# Patient Record
Sex: MALE | Race: WHITE | NOT HISPANIC OR LATINO | Employment: FULL TIME | URBAN - METROPOLITAN AREA
[De-identification: names, ages, dates, MRNs, and addresses within clinical notes are randomized per-mention and may not be internally consistent; named-entity substitution may affect disease eponyms.]

---

## 2023-08-09 ENCOUNTER — HOSPITAL ENCOUNTER (EMERGENCY)
Facility: HOSPITAL | Age: 28
Discharge: HOME | End: 2023-08-09
Attending: EMERGENCY MEDICINE
Payer: COMMERCIAL

## 2023-08-09 VITALS
RESPIRATION RATE: 18 BRPM | HEIGHT: 71 IN | SYSTOLIC BLOOD PRESSURE: 137 MMHG | WEIGHT: 170 LBS | TEMPERATURE: 98 F | OXYGEN SATURATION: 97 % | HEART RATE: 70 BPM | DIASTOLIC BLOOD PRESSURE: 81 MMHG | BODY MASS INDEX: 23.8 KG/M2

## 2023-08-09 DIAGNOSIS — K64.0 GRADE I HEMORRHOIDS: ICD-10-CM

## 2023-08-09 DIAGNOSIS — K64.4 EXTERNAL HEMORRHOIDS: ICD-10-CM

## 2023-08-09 DIAGNOSIS — K64.5 THROMBOSED EXTERNAL HEMORRHOID: Primary | ICD-10-CM

## 2023-08-09 PROCEDURE — 46083 INC THROMBOSED HROID XTRNL: CPT | Performed by: SURGERY

## 2023-08-09 PROCEDURE — 99284 EMERGENCY DEPT VISIT MOD MDM: CPT

## 2023-08-09 RX ORDER — LIDOCAINE HYDROCHLORIDE AND EPINEPHRINE 10; 10 MG/ML; UG/ML
40 INJECTION, SOLUTION INFILTRATION; PERINEURAL ONCE
Status: COMPLETED | OUTPATIENT
Start: 2023-08-09 | End: 2023-08-09

## 2023-08-09 RX ORDER — QUETIAPINE FUMARATE 200 MG/1
200 TABLET, FILM COATED ORAL
COMMUNITY

## 2023-08-09 RX ORDER — BUPROPION HYDROCHLORIDE 450 MG/1
450 TABLET, FILM COATED, EXTENDED RELEASE ORAL DAILY
COMMUNITY

## 2023-08-09 RX ADMIN — LIDOCAINE HYDROCHLORIDE,EPINEPHRINE BITARTRATE 40 ML: 10; .01 INJECTION, SOLUTION INFILTRATION; PERINEURAL at 12:38

## 2023-08-09 NOTE — DISCHARGE INSTRUCTIONS
Instructions per surgery. Return to the ER for any new, concerning, or worsening issues. Please follow-up with the surgery department in 7 days for repeat evaluation. Please call the number attached to your instructions.
Yes

## 2023-08-09 NOTE — PROCEDURES
Incision and drain    Date/Time: 8/9/2023 1:03 PM    Performed by: Anny Hahn MD  Authorized by: Anny Hahn MD  Universal Protocol:  Procedure performed by: Orly Diaz [facility])  Consent: Verbal consent obtained. Risks and benefits: risks, benefits and alternatives were discussed  Time out: Immediately prior to procedure a "time out" was called to verify the correct patient, procedure, equipment, support staff and site/side marked as required. Timeout called at: 8/9/2023 1:03 PM.  Patient understanding: patient states understanding of the procedure being performed  Patient consent: the patient's understanding of the procedure matches consent given  Site marked: the operative site was marked  Patient identity confirmed: verbally with patient      Patient location:  ED  Location:     Indications for incision and drainage: Thrombosed external hemorrhoid. Location:  Anogenital    Anogenital location:  Perianal  Pre-procedure details:     Skin preparation:  Betadine  Anesthesia (see MAR for exact dosages): Anesthesia method:  Local infiltration    Local anesthetic:  Lidocaine 1% WITH epi  Procedure details:     Complexity:  Simple    Incision types:  Single straight    Scalpel blade:  15    Approach:  Open    Incision depth:  Subcutaneous    Wound management:  Probed and deloculated    Drainage:  Bloody    Drainage amount:  Scant    Wound treatment:  Wound left open    Packing materials:  None  Post-procedure details:     Patient tolerance of procedure:   Tolerated well, no immediate complications

## 2023-08-09 NOTE — CONSULTS
Consultation - General Surgery   Yessica Castro 33 y.o. male MRN: [id number]  Unit/Bed#: ED 19 Encounter: [id number]    Assessment/Plan     Assessment:  28yo male with no significant PMH presents with external hemorrhoid   -one external hemorrhoid noted at 3 o'clock, soft, extremely tender     Plan:  -external hemorrhoid I&D at bedside by Dr Lory Onofre  -patient okay to be discharged post procedure and follow up in our office next week  -continue sitz baths and analgesia as needed  -examined at bedside with Dr Lory Onofre    History of Present Illness     HPI:  Yessica Castro is a 33 y.o. male who presents with an external hemorrhoid. This has been extremely painful for him the last few days and he would like us to open the area. He has two external hemorrhoids in the last two years that were treated with I&D. He has tried sitz baths and external creams without relief. Denies fever, chills, abdominal pain. Consults    Review of Systems   Constitutional: Negative. HENT: Negative. Respiratory: Negative. Cardiovascular: Negative. Gastrointestinal: Positive for rectal pain. Genitourinary: Negative. Musculoskeletal: Negative. Skin: Negative. Neurological: Negative. Psychiatric/Behavioral: Negative. Historical Information   History reviewed. No pertinent past medical history.   Past Surgical History:   Procedure Laterality Date   • COLONOSCOPY     • WISDOM TOOTH EXTRACTION       Social History   Social History     Substance and Sexual Activity   Alcohol Use Not Currently     Social History     Substance and Sexual Activity   Drug Use Yes   • Types: Marijuana     E-Cigarette/Vaping   • E-Cigarette Use Never User      E-Cigarette/Vaping Substances     Social History     Tobacco Use   Smoking Status Never   Smokeless Tobacco Never     Family History: non-contributory    Meds/Allergies   all current active meds have been reviewed  No Known Allergies    Objective   First Vitals: Blood Pressure: 137/81 (08/09/23 1010)  Pulse: 70 (08/09/23 1010)  Temperature: 98 °F (36.7 °C) (08/09/23 1010)  Temp Source: Temporal (08/09/23 1010)  Respirations: 18 (08/09/23 1010)  Height: 5' 11" (180.3 cm) (08/09/23 1010)  Weight - Scale: 77.1 kg (170 lb) (08/09/23 1010)  SpO2: 97 % (08/09/23 1010)    Current Vitals:   Blood Pressure: 137/81 (08/09/23 1010)  Pulse: 70 (08/09/23 1010)  Temperature: 98 °F (36.7 °C) (08/09/23 1010)  Temp Source: Temporal (08/09/23 1010)  Respirations: 18 (08/09/23 1010)  Height: 5' 11" (180.3 cm) (08/09/23 1010)  Weight - Scale: 77.1 kg (170 lb) (08/09/23 1010)  SpO2: 97 % (08/09/23 1010)    No intake or output data in the 24 hours ending 08/09/23 1359    Invasive Devices     None                 Physical Exam  Constitutional:       Appearance: Normal appearance. HENT:      Head: Normocephalic and atraumatic. Nose: Nose normal.      Mouth/Throat:      Mouth: Mucous membranes are moist.      Pharynx: Oropharynx is clear. Eyes:      Conjunctiva/sclera: Conjunctivae normal.      Pupils: Pupils are equal, round, and reactive to light. Cardiovascular:      Rate and Rhythm: Normal rate and regular rhythm. Pulses: Normal pulses. Heart sounds: Normal heart sounds. Pulmonary:      Effort: Pulmonary effort is normal.      Breath sounds: Normal breath sounds. Abdominal:      General: Abdomen is flat. Bowel sounds are normal.      Palpations: Abdomen is soft. Genitourinary:     Comments: One external hemorrhoid noted at 3 o'clock, no drainage or evidence of infection. Musculoskeletal:         General: Normal range of motion. Skin:     General: Skin is warm and dry. Neurological:      General: No focal deficit present. Mental Status: He is alert. Psychiatric:         Mood and Affect: Mood normal.             Counseling / Coordination of Care  Total floor / unit time spent today 20 minutes.   Greater than 50% of total time was spent with the patient and / or family counseling and / or coordination of care.      Severiano Dutton, PA-C

## 2023-08-09 NOTE — Clinical Note
Ronnie Tian was seen and treated in our emergency department on 8/9/2023. Other - See Comments        Diagnosis:     Ksenia Simon  may return to work on return date. He may return on this date: 08/12/2023    Patient seen in the emergency department and treated by the surgical department. Patient will need to have 2 days off of work. Please excuse through the 11th. If you have any questions or concerns, please don't hesitate to call.       Hallie Staley., DO    ______________________________           _______________          _______________  Hospital Representative                              Date                                Time

## 2023-08-09 NOTE — ED PROVIDER NOTES
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 He states that when he was in the [place], he would go to the ER and they would cut them. He states that over-the-counter creams and suppositories do not work, and he just moved back to [place] and he has a small hemorrhoid at 3:00 on his anus. Patient notes he has some blood in his stool at times and difficulty with having bowel movements and notes that if it is not taking care of he will not be able to work. Patient denies other complaints at this time and is here for evaluation. Prior to Admission Medications   Prescriptions Last Dose Informant Patient Reported? Taking? QUEtiapine (SEROquel) 200 mg tablet 8/8/2023  Yes Yes   Sig: Take 200 mg by mouth daily at bedtime   buPROPion (FORFIVO XL) 450 MG 24 hr tablet 8/9/2023  Yes Yes   Sig: Take 450 mg by mouth daily      Facility-Administered Medications: None       History reviewed. No pertinent past medical history. Past Surgical History:   Procedure Laterality Date   • COLONOSCOPY     • WISDOM TOOTH EXTRACTION         History reviewed. No pertinent family history. I have reviewed and agree with the history as documented. E-Cigarette/Vaping   • E-Cigarette Use Never User      E-Cigarette/Vaping Substances     Social History     Tobacco Use   • Smoking status: Never   • Smokeless tobacco: Never   Vaping Use   • Vaping Use: Never used   Substance Use Topics   • Alcohol use: Not Currently   • Drug use: Yes     Types: Marijuana       Review of Systems   Constitutional: Positive for activity change. Negative for chills and fever. HENT: Negative for ear pain and sore throat. Eyes: Negative for pain and visual disturbance. Respiratory: Negative for cough and shortness of breath. Cardiovascular: Negative for chest pain and palpitations. Gastrointestinal: Positive for anal bleeding and blood in stool. Negative for abdominal pain and vomiting. Genitourinary: Negative for dysuria and hematuria. Musculoskeletal: Negative for arthralgias and back pain. Skin: Negative for color change and rash. Neurological: Negative for syncope. Psychiatric/Behavioral: The patient is nervous/anxious. All other systems reviewed and are negative. Physical Exam  Physical Exam  Vitals and nursing note reviewed. Constitutional:       General: He is not in acute distress. Appearance: He is well-developed. HENT:      Head: Normocephalic and atraumatic. Eyes:      Conjunctiva/sclera: Conjunctivae normal.   Cardiovascular:      Rate and Rhythm: Normal rate and regular rhythm. Heart sounds: No murmur heard. Pulmonary:      Effort: Pulmonary effort is normal. No respiratory distress. Breath sounds: Normal breath sounds. Abdominal:      Palpations: Abdomen is soft. Tenderness: There is no abdominal tenderness. Genitourinary:     Comments: At 3:00 on the anus that is about 0.6 cm in total diameter. It is not thrombosed. No evidence of cellulitis. Musculoskeletal:         General: No swelling. Cervical back: Neck supple. Skin:     General: Skin is warm and dry. Capillary Refill: Capillary refill takes less than 2 seconds. Neurological:      Mental Status: He is alert.    Psychiatric:         Mood and Affect: Mood normal.         Vital Signs  ED Triage Vitals [08/09/23 1010]   Temperature Pulse Respirations Blood Pressure SpO2   98 °F (36.7 °C) 70 18 137/81 97 %      Temp Source Heart Rate Source Patient Position - Orthostatic VS BP Location FiO2 (%)   Temporal Monitor -- -- --      Pain Score       9           Vitals:    08/09/23 1010   BP: 137/81   Pulse: 70         Visual Acuity      ED Medications  Medications   lidocaine-epinephrine (XYLOCAINE/EPINEPHRINE) 1 %-1:100,000 injection 40 mL (40 mL Infiltration Given 8/9/23 1238)       Diagnostic Studies  Results Reviewed     None                 No orders to display              Procedures  Procedures         ED Course                               SBIRT 22yo+    Flowsheet Row Most Recent Value   Initial Alcohol Screen: US AUDIT-C     1. How often do you have a drink containing alcohol? 0 Filed at: 08/09/2023 1013   2. How many drinks containing alcohol do you have on a typical day you are drinking? 0 Filed at: 08/09/2023 1013   3a. Male UNDER 65: How often do you have five or more drinks on one occasion? 0 Filed at: 08/09/2023 1013   3b. FEMALE Any Age, or MALE 65+: How often do you have 4 or more drinks on one occassion? 0 Filed at: 08/09/2023 1013   Audit-C Score 0 Filed at: 08/09/2023 1013   TALON: How many times in the past year have you. .. Used an illegal drug or used a prescription medication for non-medical reasons? Never Filed at: 08/09/2023 1013                    Medical Decision Making  Patient is a 60-year-old male who presents emergency room requesting a surgical procedure to remove or drain a hemorrhoid that he has had for the last few days. Patient notes that the pain is so severe that he is not able to work unless we are able to help him in the emergency department today. Patient appears very anxious upon my evaluation, and notes to be in mild to moderate distress. The patient notes that [place] procedural removal for drainage of a hemorrhoid in the emergency room. The patient is hoping to have the same done here today. Patient was told that my experience is to enucleate thrombosed hemorrhoids but not necessarily to drain them in the emergency department. Patient notes creams and other ointments do not work. For that reason, I contacted general surgery to have their input.    After I have consulted surgery, I received a [place] text from a GI fellow within the [facility]. 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 This had already occurred. General surgery evaluated the patient and performed the procedure at the bedside to the patient's satisfaction. Upon completion of the procedure I discussed the aftercare with surgery who noted that the patient merely needed to follow-up with the surgical PA in a week after the procedure. These were relayed on the patient's discharge instructions and the patient was discharged. External hemorrhoids: chronic illness or injury  Grade I hemorrhoids: acute illness or injury      Disposition  Final diagnoses:   External hemorrhoids   Grade I hemorrhoids     Time reflects when diagnosis was documented in both MDM as applicable and the Disposition within this note     Time User Action Codes Description Comment    8/9/2023 12:58 PM Juanita Macadamia Add [K64.4] External hemorrhoids     8/9/2023  1:00 PM Juanita Macadamia Add [K64.0] Grade I hemorrhoids     8/9/2023  1:05 PM Katie Janessa Modify [K64.4] External hemorrhoids     8/9/2023  1:05 PM Katie Janessa Add [K64.5] Thrombosed external hemorrhoid       ED Disposition     ED Disposition   Discharge    Condition   Stable    Date/Time   Wed Aug 9, 2023 12:58 PM    Comment   Waddell Goodpasture discharge to home/self care. Follow-up Information     Follow up With Specialties Details Why Contact Info    Vini Monahan PA-C Physician Assistant In 1 week  [address] [facility] [address]  [phone]            Discharge Medication List as of 8/9/2023  1:03 PM      CONTINUE these medications which have NOT CHANGED    Details   buPROPion (FORFIVO XL) 450 MG 24 hr tablet Take 450 mg by mouth daily, Historical Med      QUEtiapine (SEROquel) 200 mg tablet Take 200 mg by mouth daily at bedtime, Historical Med             No discharge procedures on file.     PDMP Review     None ED Provider  Electronically Signed by           Carlito Steel,   08/10/23 8334

## 2023-08-16 RX ORDER — VENLAFAXINE HYDROCHLORIDE 150 MG/1
225 CAPSULE, EXTENDED RELEASE ORAL DAILY
COMMUNITY

## 2023-08-16 NOTE — PROGRESS NOTES
Assessment/Plan:    No problem-specific Assessment & Plan notes found for this encounter. Subjective:      Patient ID: Andi Dougherty is a 33 y.o. male. Pt is coming in the office for s/p I & D of thromobosed external hemorrhoid 8/9/2023          Review of Systems      Objective: There were no vitals taken for this visit.          Physical Exam

## 2023-08-18 ENCOUNTER — OFFICE VISIT (OUTPATIENT)
Dept: SURGERY | Facility: CLINIC | Age: 28
End: 2023-08-18

## 2023-08-18 VITALS — TEMPERATURE: 97.9 F

## 2023-08-18 DIAGNOSIS — K64.5 THROMBOSED HEMORRHOIDS: Primary | ICD-10-CM

## 2023-08-18 RX ORDER — FLUOXETINE HYDROCHLORIDE 20 MG/1
20 CAPSULE ORAL EVERY MORNING
COMMUNITY
Start: 2023-08-10

## 2023-08-18 NOTE — PROGRESS NOTES
Post-Op Note - General Surgery   Yue Mon 33 y.o. male MRN: [id number]  Encounter: [id number]    Assessment/Plan    Thrombosed hemorrhoids  Doing well after I&D in the ER for thrombosed external hemorrhoid. Reports no residual bleeding or draining at this point. Full resolution of symptoms. He defers examination of the office today. Agreeable to call or come back with recurrent symptoms. Questions answered, agreeable to plan. Diagnoses and all orders for this visit:    Thrombosed hemorrhoids    Other orders  -     venlafaxine (EFFEXOR-XR) 150 mg 24 hr capsule; Take 225 mg by mouth daily (Patient not taking: Reported on 8/18/2023)  -     FLUoxetine (PROzac) 20 mg capsule; Take 20 mg by mouth every morning        Subjective      Chief Complaint   Patient presents with   • Post-op     s/p I & D of thromobosed external hemorrhoid 8/9/2023     Patient is here today for a follow up after having a I & D of thromobosed external hemorrhoid 8/9/2023. Patient states the wound is closed and denies drainage, pain or fevers/chills. Patient states he gets these hemorrhoids all the time due to stress. Valentin CHAVEZ MA       Review of Systems   All other systems reviewed and are negative. The following portions of the patient's history were reviewed and updated as appropriate: allergies, current medications, past family history, past medical history, past social history, past surgical history and problem list.    Objective      Temperature 97.9 °F (36.6 °C), temperature source Temporal.   Physical Exam  Vitals and nursing note reviewed. Constitutional:       General: He is not in acute distress. Appearance: He is well-developed. He is not diaphoretic. HENT:      Head: Normocephalic and atraumatic. Eyes:      Conjunctiva/sclera: Conjunctivae normal.      Pupils: Pupils are equal, round, and reactive to light. Pulmonary:      Effort: No respiratory distress.    Genitourinary:     Comments: Deferred  Musculoskeletal:         General: Normal range of motion. Cervical back: Normal range of motion. Skin:     General: Skin is warm and dry. Capillary Refill: Capillary refill takes less than 2 seconds. Neurological:      Mental Status: He is alert and oriented to person, place, and time.    Psychiatric:         Behavior: Behavior normal.         Signature:  Vini Monahan PA-C  Date: 8/28/2023 Time: 10:17 AM

## 2023-08-23 ENCOUNTER — TELEPHONE (OUTPATIENT)
Age: 28
End: 2023-08-23

## 2023-08-28 PROBLEM — K64.5 THROMBOSED HEMORRHOIDS: Status: ACTIVE | Noted: 2023-08-28

## 2023-08-28 NOTE — ASSESSMENT & PLAN NOTE
Doing well after I&D in the ER for thrombosed external hemorrhoid. Reports no residual bleeding or draining at this point. Full resolution of symptoms. He defers examination of the office today. Agreeable to call or come back with recurrent symptoms. Questions answered, agreeable to plan.

## 2023-10-04 NOTE — PROGRESS NOTES
Diagnoses and all orders for this visit:    Thrombosed hemorrhoids       Thrombosed hemorrhoids  Patient presents for evaluation of thrombosed external hemorrhoid. Patient notes he has had 3 distinct episodes of this. He is unsure what brings them on but thinks it may be stress related. He has undergone what is described as incision and drainage of these. He does not have concerning episodes in between. He notes that these are all described as blood clots. Examination today in the office is normal.    I do not see any signs of any ongoing perianal complaints. By description, the sound like thrombosed external hemorrhoids not perianal abscess or fistula. Examination today is unremarkable. I do not offer any specific recommendations as there is no prophylactic surgery to prevent thrombosed external hemorrhoids. Avoidance of long periods of time in the toilet, avoidance of straining, high-fiber diet and hydration were recommended in general.  He will return as needed. TRESSA Kay is here today for evaluation. Patient presented to the emergency room on 8/9/2023 and I&D was performed for thrombosed external hemorrhoid. .    The patient notes this to be a recurrent issue for 2 years now, treated with excision at the emergency department back in [place]. .     The patient notes his last colonoscopy was around 8 months ago in [place] and as per his recollection it was within normal limits.      Past Medical History:   Diagnosis Date   • Anxiety    • Asthma    • Depression      Past Surgical History:   Procedure Laterality Date   • COLONOSCOPY     • WISDOM TOOTH EXTRACTION         Current Outpatient Medications:   •  buPROPion (FORFIVO XL) 450 MG 24 hr tablet, Take 450 mg by mouth daily 3 tabs of 150 mg in the morning, Disp: , Rfl:   •  FLUoxetine (PROzac) 20 mg capsule, Take 20 mg by mouth every morning, Disp: , Rfl:   •  QUEtiapine (SEROquel) 200 mg tablet, Take 200 mg by mouth daily at bedtime, Disp: , Rfl:   •  venlafaxine (EFFEXOR-XR) 150 mg 24 hr capsule, Take 225 mg by mouth daily (Patient not taking: Reported on 8/18/2023), Disp: , Rfl:   Allergies as of 10/06/2023   • (No Known Allergies)     Review of Systems   All other systems reviewed and are negative. Vitals:    10/06/23 0901   BP: 138/76     Physical Exam  Constitutional:       Appearance: Normal appearance. HENT:      Head: Normocephalic and atraumatic. Eyes:      Extraocular Movements: Extraocular movements intact. Pupils: Pupils are equal, round, and reactive to light. Musculoskeletal:         General: Normal range of motion. Skin:     General: Skin is warm and dry. Neurological:      General: No focal deficit present. Mental Status: He is alert and oriented to person, place, and time. Psychiatric:         Mood and Affect: Mood normal.         Behavior: Behavior normal.         Thought Content: Thought content normal.         Judgment: Judgment normal.     [facility] Endoscopy    Date/Time: 10/6/2023 9:00 AM    Performed by: Vivienne Albert MD  Authorized by: Vivienne Albert MD    Verbal consent obtained?: Yes    Risks and benefits: Risks, benefits and alternatives were discussed    Consent given by:  Patient  Indications: rectal pain    Patient sedated: No    Scope type:   Anoscope  External exam performed: Yes    Pilonidal sinus tract: No    Pilonidal cyst: No    Pilonidal tenderness: No    Perianal skin tags: No    Perirectal warts: No    Perianal maceration: No    Perianal induration: No    Perianal erythema: No    External hemorrhoids: No    Digital exam performed: Yes    Laxity of anal sphincter: No    Occult blood in stool: No    Prostate tenderness: No    Prostate enlargement: No    Prostate nodules: No    Internal hemorrhoids: No    Diverticulosis: No    Inflammation: No    Anal fissures: No    Anal fistulae: No    Anal stricture: No    Abscess: No    Procedure termination:  Procedure complete  Patient tolerance:  Patient tolerated the procedure well with no immediate complications

## 2023-10-06 ENCOUNTER — OFFICE VISIT (OUTPATIENT)
Age: 28
End: 2023-10-06
Payer: COMMERCIAL

## 2023-10-06 VITALS
WEIGHT: 178 LBS | BODY MASS INDEX: 24.92 KG/M2 | HEIGHT: 71 IN | DIASTOLIC BLOOD PRESSURE: 76 MMHG | SYSTOLIC BLOOD PRESSURE: 138 MMHG

## 2023-10-06 DIAGNOSIS — K64.5 THROMBOSED HEMORRHOIDS: Primary | ICD-10-CM

## 2023-10-06 PROCEDURE — 99203 OFFICE O/P NEW LOW 30 MIN: CPT | Performed by: COLON & RECTAL SURGERY

## 2023-10-06 PROCEDURE — 46600 DIAGNOSTIC ANOSCOPY SPX: CPT | Performed by: COLON & RECTAL SURGERY

## 2023-10-06 NOTE — ASSESSMENT & PLAN NOTE
Patient presents for evaluation of thrombosed external hemorrhoid. Patient notes he has had 3 distinct episodes of this. He is unsure what brings them on but thinks it may be stress related. He has undergone what is described as incision and drainage of these. He does not have concerning episodes in between. He notes that these are all described as blood clots. Examination today in the office is normal.    I do not see any signs of any ongoing perianal complaints. By description, the sound like thrombosed external hemorrhoids not perianal abscess or fistula. Examination today is unremarkable. I do not offer any specific recommendations as there is no prophylactic surgery to prevent thrombosed external hemorrhoids. Avoidance of long periods of time in the toilet, avoidance of straining, high-fiber diet and hydration were recommended in general.  He will return as needed.